# Patient Record
Sex: FEMALE | URBAN - METROPOLITAN AREA
[De-identification: names, ages, dates, MRNs, and addresses within clinical notes are randomized per-mention and may not be internally consistent; named-entity substitution may affect disease eponyms.]

---

## 2017-07-14 ENCOUNTER — TELEPHONE (OUTPATIENT)
Dept: INTERNAL MEDICINE | Facility: CLINIC | Age: 77
End: 2017-07-14

## 2017-07-14 NOTE — TELEPHONE ENCOUNTER
Received fax [facility] and [facility]. Pt has never seen  and doesn't have an appt scheduled. Left message on voice mail for call back to find out what papers are for.